# Patient Record
Sex: FEMALE | URBAN - METROPOLITAN AREA
[De-identification: names, ages, dates, MRNs, and addresses within clinical notes are randomized per-mention and may not be internally consistent; named-entity substitution may affect disease eponyms.]

---

## 2017-02-09 ENCOUNTER — APPOINTMENT (RX ONLY)
Dept: URBAN - METROPOLITAN AREA CLINIC 152 | Facility: CLINIC | Age: 37
Setting detail: DERMATOLOGY
End: 2017-02-09

## 2017-02-09 DIAGNOSIS — L81.8 OTHER SPECIFIED DISORDERS OF PIGMENTATION: ICD-10-CM

## 2017-02-09 PROCEDURE — ? COSMETIC CONSULTATION: TATTOO REMOVAL

## 2017-02-09 PROCEDURE — ? Q-SWITCHED LASER

## 2017-02-09 ASSESSMENT — LOCATION DETAILED DESCRIPTION DERM
LOCATION DETAILED: LEFT MEDIAL DORSAL FOOT
LOCATION DETAILED: LEFT ANKLE
LOCATION DETAILED: LEFT DORSAL FOOT
LOCATION DETAILED: 2ND WEBSPACE LEFT FOOT

## 2017-02-09 ASSESSMENT — LOCATION ZONE DERM
LOCATION ZONE: FEET
LOCATION ZONE: LEG

## 2017-02-09 ASSESSMENT — LOCATION SIMPLE DESCRIPTION DERM
LOCATION SIMPLE: LEFT FOOT
LOCATION SIMPLE: LEFT ANKLE

## 2017-02-09 NOTE — HPI: COSMETIC CONSULTATION
When Outside In The Sun, Do You...: mostly tans, rarely burns
Additional History: Patient has had 5 laser treatments at another facility with no improvement. Unknown what type of laser was used

## 2017-02-09 NOTE — PROCEDURE: Q-SWITCHED LASER
Fluence: 4
Spot Size In Mm: 2
Number Of Pulses: 1101
Frequency In Hz: 1
Endpoint: Immediate endpoint whitening and pinpoint bleeding. Vaseline and ice applied. Post care reviewed with patient.
Frequency In Hz: 5
Area In Cm^2: 0
Fluence: 0.1
Anesthesia Type: 1% lidocaine with epinephrine
Spot Size In Mm: 6
Detail Level: Detailed
Consent: Written consent obtained, risks reviewed including but not limited to crusting, scabbing, blistering, scarring, darker or lighter pigmentary change, systemic reactions, ulceration, incidental hair removal, bruising, and/or incomplete removal.
Price (Use Numbers Only, No Special Characters Or $): 125
Price (Use Numbers Only, No Special Characters Or $): 100
Post-Care Instructions: I reviewed with the patient in detail post-care instructions. Patient should avoid sunlight and wear sun protection.

## 2017-03-17 ENCOUNTER — APPOINTMENT (RX ONLY)
Dept: URBAN - METROPOLITAN AREA CLINIC 152 | Facility: CLINIC | Age: 37
Setting detail: DERMATOLOGY
End: 2017-03-17

## 2017-03-17 DIAGNOSIS — Z41.9 ENCOUNTER FOR PROCEDURE FOR PURPOSES OTHER THAN REMEDYING HEALTH STATE, UNSPECIFIED: ICD-10-CM

## 2017-03-17 DIAGNOSIS — L30.8 OTHER SPECIFIED DERMATITIS: ICD-10-CM

## 2017-03-17 PROBLEM — F32.9 MAJOR DEPRESSIVE DISORDER, SINGLE EPISODE, UNSPECIFIED: Status: ACTIVE | Noted: 2017-03-17

## 2017-03-17 PROCEDURE — 99212 OFFICE O/P EST SF 10 MIN: CPT

## 2017-03-17 PROCEDURE — ? COUNSELING

## 2017-03-17 PROCEDURE — ? PRESCRIPTION

## 2017-03-17 PROCEDURE — ? LASER TATTOO REMOVAL

## 2017-03-17 RX ORDER — FLUOCINONIDE 0.5 MG/ML
CREAM TOPICAL
Qty: 1 | Refills: 0 | Status: ERX | COMMUNITY
Start: 2017-03-17

## 2017-03-17 RX ADMIN — FLUOCINONIDE: 0.5 CREAM TOPICAL at 19:16

## 2017-03-17 ASSESSMENT — LOCATION ZONE DERM: LOCATION ZONE: LEG

## 2017-03-17 ASSESSMENT — LOCATION DETAILED DESCRIPTION DERM: LOCATION DETAILED: LEFT ANKLE

## 2017-03-17 ASSESSMENT — LOCATION SIMPLE DESCRIPTION DERM: LOCATION SIMPLE: LEFT ANKLE

## 2017-03-17 NOTE — HPI: COSMETIC (LASER TATTOO REMOVAL)
previous_has_had_previous_treatments
When Was Your Last Laser Treatment?: 2/17
Number Of Treatments: 1
Additional History: Patient quoted $200 for today's tattoo removal.

## 2017-03-17 NOTE — PROCEDURE: LASER TATTOO REMOVAL
Post-Care Instructions: I reviewed with the patient in detail post-care instructions. Patient should apply vaseline twice daily to tattoo and keep it covered with a non-stick adhesive dressing.
Spot Size In Mm: 2
Anesthesia Type: 1% lidocaine with epinephrine
Spot Size In Mm: 4
Endpoint Text: Immediate endpoint: skin whitening and pinpoint bleeding.
Laser Type: Q-switched Alexandrite 755nm
Post-Procedure Text: Vaseline and ice applied. Post care reviewed with patient.
Price (Use Numbers Only, No Special Characters Or $): 200
Anesthesia Volume In Cc: 0
External Cooling: Keyur Cryo 5
Consent: Written consent obtained, risks reviewed including but not limited to crusting, scabbing, blistering, scarring, darker or lighter pigmentary change, systemic reactions, ulceration, incidental hair removal, bruising, and/or incomplete removal.
External Cooling Fan Speed: 3
Detail Level: Detailed
Pre-Procedure Text: The treatment areas were cleaned and treated with the laser parameters noted above.

## 2017-04-28 ENCOUNTER — APPOINTMENT (RX ONLY)
Dept: URBAN - METROPOLITAN AREA CLINIC 152 | Facility: CLINIC | Age: 37
Setting detail: DERMATOLOGY
End: 2017-04-28

## 2017-04-28 DIAGNOSIS — L81.8 OTHER SPECIFIED DISORDERS OF PIGMENTATION: ICD-10-CM

## 2017-04-28 PROCEDURE — ? LASER TATTOO REMOVAL

## 2017-04-28 ASSESSMENT — LOCATION ZONE DERM
LOCATION ZONE: FEET
LOCATION ZONE: LEG

## 2017-04-28 ASSESSMENT — LOCATION SIMPLE DESCRIPTION DERM
LOCATION SIMPLE: LEFT ANKLE
LOCATION SIMPLE: LEFT FOOT

## 2017-04-28 ASSESSMENT — LOCATION DETAILED DESCRIPTION DERM
LOCATION DETAILED: LEFT ANKLE
LOCATION DETAILED: LEFT DORSAL FOOT

## 2017-04-28 NOTE — PROCEDURE: LASER TATTOO REMOVAL
Endpoint Text: Immediate endpoint: skin whitening. 1901 pulses
Anesthesia Type: 1% lidocaine with epinephrine
Laser Type: Q-switched Alexandrite 755nm
Spot Size In Mm: 0
Fluence: 4.4
Detail Level: Detailed
External Cooling: Keyur Cryo 5
Post-Care Instructions: I reviewed with the patient in detail post-care instructions. Patient should apply vaseline twice daily to tattoo and keep it covered with a non-stick adhesive dressing.
Spot Size In Mm: 4
Tattoo Color Treated: Black
External Cooling Fan Speed: 3
Post-Procedure Text: Aloe Vera and ice applied. Post care reviewed with patient.
Pre-Procedure Text: The treatment areas were cleaned and treated with the laser parameters noted above.
Fluence: 4.8
Price (Use Numbers Only, No Special Characters Or $): 200
Consent: Written consent obtained, risks reviewed including but not limited to crusting, scabbing, blistering, scarring, darker or lighter pigmentary change, systemic reactions, ulceration, incidental hair removal, bruising, and/or incomplete removal.

## 2018-07-10 ENCOUNTER — APPOINTMENT (RX ONLY)
Dept: URBAN - METROPOLITAN AREA CLINIC 18 | Facility: CLINIC | Age: 38
Setting detail: DERMATOLOGY
End: 2018-07-10

## 2018-07-10 PROBLEM — Z41.8 ENCOUNTER FOR OTHER PROCEDURES FOR PURPOSES OTHER THAN REMEDYING HEALTH STATE: Status: ACTIVE | Noted: 2018-07-10

## 2018-07-10 PROCEDURE — ? TREATMENT REGIMEN

## 2018-07-10 PROCEDURE — 99211 OFF/OP EST MAY X REQ PHY/QHP: CPT

## 2018-07-10 NOTE — PROCEDURE: TREATMENT REGIMEN
Detail Level: Zone
Plan: 4-6 tx 6 weeks apart. $272/tx
Initiate Treatment: Laser tattoo removal consultation

## 2018-07-13 ENCOUNTER — APPOINTMENT (RX ONLY)
Dept: URBAN - METROPOLITAN AREA CLINIC 18 | Facility: CLINIC | Age: 38
Setting detail: DERMATOLOGY
End: 2018-07-13

## 2018-07-13 PROBLEM — Z41.8 ENCOUNTER FOR OTHER PROCEDURES FOR PURPOSES OTHER THAN REMEDYING HEALTH STATE: Status: ACTIVE | Noted: 2018-07-13

## 2018-07-13 PROBLEM — J30.1 ALLERGIC RHINITIS DUE TO POLLEN: Status: ACTIVE | Noted: 2018-07-13

## 2018-07-13 PROCEDURE — ? LASER TATTOO REMOVAL

## 2018-07-13 NOTE — PROCEDURE: LASER TATTOO REMOVAL
Spot Size In Mm: 5
Laser Type: Q-switched Alexandrite 755nm
Length Of Topical Anesthesia Application (Optional): 20 minutes
Spot Size In Mm: 2
Post-Procedure Text: Vaseline and ice applied. Post care reviewed with patient.
Consent: Written consent obtained, risks reviewed including but not limited to crusting, scabbing, blistering, scarring, darker or lighter pigmentary change, systemic reactions, ulceration, incidental hair removal, bruising, and/or incomplete removal.
Endpoint Text: Immediate endpoint: skin whitening.
Post-Care Instructions: I reviewed with the patient in detail post-care instructions. Patient should apply vaseline twice daily to tattoo and keep it covered with a non-stick adhesive dressing.
External Cooling Fan Speed: 0
Price (Use Numbers Only, No Special Characters Or $): 537
Detail Level: Detailed
Anesthesia Type: 2% lidocaine, 0.25% Marcaine and a 1:10 solution of sodium bicarbonate
Laser Type: Q-switched Nd:Yag 532nm
Tattoo Color Treated: Black
Topical Anesthesia?: BLT cream (benzocaine 20%, lidocaine 10%, tetracaine 10%)
Wavelength Used (Optional - Include Units): 600J
Pre-Procedure Text: The treatment areas were cleaned and treated with the laser parameters noted above.

## 2018-08-31 ENCOUNTER — APPOINTMENT (RX ONLY)
Dept: URBAN - METROPOLITAN AREA CLINIC 18 | Facility: CLINIC | Age: 38
Setting detail: DERMATOLOGY
End: 2018-08-31

## 2018-08-31 PROBLEM — Z41.8 ENCOUNTER FOR OTHER PROCEDURES FOR PURPOSES OTHER THAN REMEDYING HEALTH STATE: Status: ACTIVE | Noted: 2018-08-31

## 2018-08-31 PROCEDURE — ? LASER TATTOO REMOVAL

## 2018-08-31 NOTE — PROCEDURE: LASER TATTOO REMOVAL
Post-Care Instructions: I reviewed with the patient in detail post-care instructions. Patient should apply vaseline twice daily to tattoo and keep it covered with a non-stick adhesive dressing.
Fluence: 1
Length Of Topical Anesthesia Application (Optional): 20 minutes
Laser Type: Q-switched Alexandrite 755nm
Endpoint Text: Immediate endpoint: skin whitening.
Consent: Written consent obtained, risks reviewed including but not limited to crusting, scabbing, blistering, scarring, darker or lighter pigmentary change, systemic reactions, ulceration, incidental hair removal, bruising, and/or incomplete removal.
Anesthesia Volume In Cc: 0
Detail Level: Detailed
Anesthesia Type: 2% lidocaine, 0.25% Marcaine and a 1:10 solution of sodium bicarbonate
Pre-Procedure Text: The treatment areas were cleaned and treated with the laser parameters noted above.
Tattoo Color Treated: Black
Topical Anesthesia?: BLT cream (benzocaine 20%, lidocaine 10%, tetracaine 10%)
Spot Size In Mm: 2
Price (Use Numbers Only, No Special Characters Or $): 532
Spot Size In Mm: 5
Wavelength Used (Optional - Include Units): 600J
Post-Procedure Text: Vaseline and ice applied. Post care reviewed with patient.
Laser Type: Q-switched Nd:Yag 532nm
Good

## 2018-10-12 ENCOUNTER — APPOINTMENT (RX ONLY)
Dept: URBAN - METROPOLITAN AREA CLINIC 18 | Facility: CLINIC | Age: 38
Setting detail: DERMATOLOGY
End: 2018-10-12

## 2018-10-12 PROBLEM — Z41.8 ENCOUNTER FOR OTHER PROCEDURES FOR PURPOSES OTHER THAN REMEDYING HEALTH STATE: Status: ACTIVE | Noted: 2018-10-12

## 2018-10-12 PROCEDURE — ? LASER TATTOO REMOVAL

## 2018-10-12 NOTE — PROCEDURE: LASER TATTOO REMOVAL
Spot Size In Mm: 2
Consent: Written consent obtained, risks reviewed including but not limited to crusting, scabbing, blistering, scarring, darker or lighter pigmentary change, systemic reactions, ulceration, incidental hair removal, bruising, and/or incomplete removal.
Laser Type: Q-switched Alexandrite 755nm
Pre-Procedure Text: The treatment areas were cleaned and treated with the laser parameters noted above.
External Cooling Fan Speed: 0
Endpoint Text: Immediate endpoint: skin whitening.
Spot Size In Mm: 3
Post-Procedure Text: Vaseline and ice applied. Post care reviewed with patient.
Price (Use Numbers Only, No Special Characters Or $): 609
Laser Type: Q-switched Nd:Yag 532nm
Tattoo Color Treated: Black
Topical Anesthesia?: BLT cream (benzocaine 20%, lidocaine 10%, tetracaine 10%)
Anesthesia Type: 2% lidocaine, 0.25% Marcaine and a 1:10 solution of sodium bicarbonate
Length Of Topical Anesthesia Application (Optional): 20 minutes
Wavelength Used (Optional - Include Units): 700J
Detail Level: Detailed
Post-Care Instructions: I reviewed with the patient in detail post-care instructions. Patient should apply vaseline twice daily to tattoo and keep it covered with a non-stick adhesive dressing.